# Patient Record
Sex: FEMALE | Race: WHITE | NOT HISPANIC OR LATINO | URBAN - METROPOLITAN AREA
[De-identification: names, ages, dates, MRNs, and addresses within clinical notes are randomized per-mention and may not be internally consistent; named-entity substitution may affect disease eponyms.]

---

## 2018-01-22 ENCOUNTER — IMPORTED ENCOUNTER (OUTPATIENT)
Dept: URBAN - METROPOLITAN AREA CLINIC 50 | Facility: CLINIC | Age: 68
End: 2018-01-22

## 2021-04-17 ASSESSMENT — VISUAL ACUITY
OS_CC: J1
OD_CC: 20/25
OS_CC: 20/25
OD_CC: J1

## 2021-04-17 ASSESSMENT — TONOMETRY
OS_IOP_MMHG: 16
OD_IOP_MMHG: 16

## 2021-08-20 ENCOUNTER — PREPPED CHART (OUTPATIENT)
Dept: URBAN - METROPOLITAN AREA CLINIC 49 | Facility: CLINIC | Age: 71
End: 2021-08-20

## 2021-08-24 ENCOUNTER — NEW PATIENT COMPREHENSIVE (OUTPATIENT)
Dept: URBAN - METROPOLITAN AREA CLINIC 49 | Facility: CLINIC | Age: 71
End: 2021-08-24

## 2021-08-24 DIAGNOSIS — H04.123: ICD-10-CM

## 2021-08-24 DIAGNOSIS — H05.20: ICD-10-CM

## 2021-08-24 DIAGNOSIS — H35.372: ICD-10-CM

## 2021-08-24 DIAGNOSIS — H33.011: ICD-10-CM

## 2021-08-24 PROCEDURE — 92004 COMPRE OPH EXAM NEW PT 1/>: CPT

## 2021-08-24 PROCEDURE — 92015 DETERMINE REFRACTIVE STATE: CPT

## 2021-08-24 PROCEDURE — 92133 CPTRZD OPH DX IMG PST SGM ON: CPT

## 2021-08-24 PROCEDURE — 92134 CPTRZ OPH DX IMG PST SGM RTA: CPT

## 2021-08-24 ASSESSMENT — KERATOMETRY
OS_AXISANGLE2_DEGREES: 2
OD_K1POWER_DIOPTERS: 41.50
OD_AXISANGLE2_DEGREES: 169
OS_K1POWER_DIOPTERS: 40.75
OD_K2POWER_DIOPTERS: 42.50
OS_AXISANGLE_DEGREES: 092
OS_K2POWER_DIOPTERS: 42.75
OD_AXISANGLE_DEGREES: 079

## 2021-08-24 ASSESSMENT — VISUAL ACUITY
OU_CC: 20/20
OS_SC: J7
OU_SC: J8
OD_CC: 20/25-2
OU_SC: 20/40
OU_CC: J1+
OS_SC: 20/60-1
OD_CC: J4
OD_SC: 20/40
OD_SC: J8
OS_CC: 20/25
OS_CC: J1

## 2021-08-24 ASSESSMENT — TONOMETRY
OD_IOP_MMHG: 15
OS_IOP_MMHG: 15

## 2021-08-24 NOTE — PATIENT DISCUSSION
History of Thyroid Eye Disease. Patient admits to having Diplopia OS when she is reading. Full EOM found on exam today. No need for Prism or further workup. Advised patient if she experiences an increase in Diplopia or further proptosis, will order CT of Brain/Orbits for further evaluation. Patient expressed understanding.

## 2022-02-18 ENCOUNTER — ESTABLISHED PATIENT (OUTPATIENT)
Dept: URBAN - METROPOLITAN AREA CLINIC 49 | Facility: CLINIC | Age: 72
End: 2022-02-18

## 2022-02-18 DIAGNOSIS — H35.372: ICD-10-CM

## 2022-02-18 DIAGNOSIS — H05.20: ICD-10-CM

## 2022-02-18 PROCEDURE — 92134 CPTRZ OPH DX IMG PST SGM RTA: CPT

## 2022-02-18 PROCEDURE — 92133 CPTRZD OPH DX IMG PST SGM ON: CPT

## 2022-02-18 PROCEDURE — 92014 COMPRE OPH EXAM EST PT 1/>: CPT

## 2022-02-18 ASSESSMENT — VISUAL ACUITY
OD_CC: 20/25
OS_CC: 20/25
OU_CC: 20/20

## 2022-02-18 ASSESSMENT — TONOMETRY
OS_IOP_MMHG: 16
OD_IOP_MMHG: 16

## 2022-02-18 NOTE — PATIENT DISCUSSION
History of Thyroid Eye Disease. Patient is followed by PCP. Patient states diplopia has improved recently with change in medication. . Full EOM found on exam today. No need for Prism at this time Patient states she had recent CT of orbits and informed results were normal. OCT RNFL WNL and stable to 8/2021. Patient was previously followed by Neuro-ophth and plastics at [facility]. Recommend referral to [facility] to Neuro-ophth for further evaluation.

## 2022-11-08 ENCOUNTER — ESTABLISHED PATIENT (OUTPATIENT)
Dept: URBAN - METROPOLITAN AREA CLINIC 48 | Facility: LOCATION | Age: 72
End: 2022-11-08

## 2022-11-08 DIAGNOSIS — H04.123: ICD-10-CM

## 2022-11-08 PROCEDURE — 92012 INTRM OPH EXAM EST PATIENT: CPT

## 2022-11-08 RX ORDER — LOTEPREDNOL ETABONATE 3.8 MG/G: 1 GEL OPHTHALMIC

## 2022-11-08 ASSESSMENT — TONOMETRY
OS_IOP_MMHG: 17
OD_IOP_MMHG: 16

## 2022-11-08 ASSESSMENT — VISUAL ACUITY
OS_PH: 20/20
OS_CC: 20/40
OD_CC: 20/20-2

## 2022-11-08 NOTE — PATIENT DISCUSSION
History of Thyroid Eye Disease. Patient is followed by PCP. Patient states diplopia has improved recently with change in medication. . Full EOM found on exam today. No need for Prism at this time Patient states she had recent CT of orbits and informed results were normal. OCT RNFL WNL and stable to 8/2021. Patient was previously followed by Neuro-ophth and plastics at [facility]. Recommend referral to [facility] to Neuro-ophth for further evaluation.

## 2024-01-02 ENCOUNTER — COMPREHENSIVE EXAM (OUTPATIENT)
Dept: URBAN - METROPOLITAN AREA CLINIC 49 | Facility: LOCATION | Age: 74
End: 2024-01-02

## 2024-01-02 DIAGNOSIS — H35.373: ICD-10-CM

## 2024-01-02 DIAGNOSIS — H33.011: ICD-10-CM

## 2024-01-02 DIAGNOSIS — Z98.890: ICD-10-CM

## 2024-01-02 DIAGNOSIS — H05.20: ICD-10-CM

## 2024-01-02 DIAGNOSIS — H53.2: ICD-10-CM

## 2024-01-02 DIAGNOSIS — H40.013: ICD-10-CM

## 2024-01-02 DIAGNOSIS — H04.123: ICD-10-CM

## 2024-01-02 DIAGNOSIS — E05.00: ICD-10-CM

## 2024-01-02 PROCEDURE — 92015 DETERMINE REFRACTIVE STATE: CPT

## 2024-01-02 PROCEDURE — 99214 OFFICE O/P EST MOD 30 MIN: CPT

## 2024-01-02 ASSESSMENT — VISUAL ACUITY
OU_CC: J2@17"
OS_CC: 20/70+2
OS_PH: 20/60
OU_SC: 20/60
OD_CC: 20/40+2
OD_PH: 20/30
OU_CC: 20/30-1
OS_SC: 20/200
OD_SC: 20/70

## 2024-01-02 ASSESSMENT — TONOMETRY
OS_IOP_MMHG: 16
OD_IOP_MMHG: 16

## 2024-06-24 ENCOUNTER — DIAGNOSTICS ONLY (OUTPATIENT)
Dept: URBAN - METROPOLITAN AREA CLINIC 49 | Facility: LOCATION | Age: 74
End: 2024-06-24

## 2024-06-24 DIAGNOSIS — H40.013: ICD-10-CM

## 2024-06-24 PROCEDURE — 92133 CPTRZD OPH DX IMG PST SGM ON: CPT

## 2024-06-24 PROCEDURE — 92083 EXTENDED VISUAL FIELD XM: CPT
